# Patient Record
Sex: MALE | Race: WHITE | NOT HISPANIC OR LATINO
[De-identification: names, ages, dates, MRNs, and addresses within clinical notes are randomized per-mention and may not be internally consistent; named-entity substitution may affect disease eponyms.]

---

## 2018-03-19 ENCOUNTER — TRANSCRIPTION ENCOUNTER (OUTPATIENT)
Age: 78
End: 2018-03-19

## 2019-11-21 ENCOUNTER — EMERGENCY (EMERGENCY)
Facility: HOSPITAL | Age: 79
LOS: 0 days | Discharge: HOME | End: 2019-11-21
Attending: EMERGENCY MEDICINE | Admitting: EMERGENCY MEDICINE
Payer: MEDICARE

## 2019-11-21 VITALS
RESPIRATION RATE: 18 BRPM | SYSTOLIC BLOOD PRESSURE: 180 MMHG | TEMPERATURE: 98 F | HEIGHT: 69 IN | HEART RATE: 90 BPM | DIASTOLIC BLOOD PRESSURE: 98 MMHG | OXYGEN SATURATION: 98 % | WEIGHT: 167.99 LBS

## 2019-11-21 VITALS — SYSTOLIC BLOOD PRESSURE: 145 MMHG | HEART RATE: 86 BPM | DIASTOLIC BLOOD PRESSURE: 97 MMHG

## 2019-11-21 DIAGNOSIS — H92.02 OTALGIA, LEFT EAR: ICD-10-CM

## 2019-11-21 DIAGNOSIS — H92.09 OTALGIA, UNSPECIFIED EAR: ICD-10-CM

## 2019-11-21 PROBLEM — Z00.00 ENCOUNTER FOR PREVENTIVE HEALTH EXAMINATION: Status: ACTIVE | Noted: 2019-11-21

## 2019-11-21 PROCEDURE — 99282 EMERGENCY DEPT VISIT SF MDM: CPT | Mod: GC

## 2019-11-21 NOTE — ED PROVIDER NOTE - OBJECTIVE STATEMENT
78M with pmh of HTN presents with episodes of sensation of L ear pulsations and tingling lasting aprox 10-15s beginning last night. PT states that he has had these episodes for years. PT denies loss of balance, other numbness, weakness, or slurring of speech.

## 2019-11-21 NOTE — ED PROVIDER NOTE - PATIENT PORTAL LINK FT
You can access the FollowMyHealth Patient Portal offered by [facility] by registering at the following website: [email or web address]. By joining [facility]’s FollowMyHealth portal, you will also be able to view your health information using other applications (apps) compatible with our system.

## 2019-11-21 NOTE — ED ADULT NURSE NOTE - NSIMPLEMENTINTERV_GEN_ALL_ED
Implemented All Universal Safety Interventions:  [place] to call system. Call bell, personal items and telephone within reach. Instruct patient to call for assistance. Room bathroom lighting operational. Non-slip footwear when patient is off stretcher. Physically safe environment: no spills, clutter or unnecessary equipment. Stretcher in lowest position, wheels locked, appropriate side rails in place.

## 2019-11-21 NOTE — ED PROVIDER NOTE - CLINICAL SUMMARY MEDICAL DECISION MAKING FREE TEXT BOX
78yoM with h/o HTN, presents with ear pulsations. States x 15 yrs has been getting intermittent pulsations in his L ear with L facial tingling, lasting 10-15 seconds at a time and self-resolving, recurred last night. Has had 2 MRI's during this time that were negative. Denies HA, blurry vision, dizziness, vomiting, weakness or numbness, ear pain, or any other symptoms. On exam, afebrile, hemodynamically stable, saturating well, NAD, well appearing, head NCAT, PERRL, EOMI, anicteric, MMM, TM's clear with sharp reflexes b/l, no external canal erythema/edema/tenderness, RRR, nml S1/S2, no m/r/g, lungs CTAB, no w/r/r, abd soft, NT, ND, nml BS, no rebound or guarding, AAO, CN's 3-12 intact, motor 5/5 in all extrems, LYN spontaneously, no leg cyanosis or edema, skin warm, dry, no rashes or hives. No e/o OM, OE, mastoiditis. No e/o dissection. Character low suspicion for SAH or other intracranial process. Has had extensive w/u for this in the past. Patient is well appearing, NAD, afebrile, hemodynamically stable. Discharged with instructions in further symptomatic care, return precautions, and need for ENT f/u. 78yoM with h/o HTN, presents with ear pulsations. States x 15 yrs has been getting intermittent pulsations in his L ear with L facial tingling, lasting 10-15 seconds at a time and self-resolving, recurred last night. Has had 2 MRI's during this time that were negative. Denies HA, blurry vision, dizziness, vomiting, weakness or numbness, ear pain, or any other symptoms. On exam, afebrile, hemodynamically stable, saturating well, NAD, well appearing, head NCAT, PERRL, EOMI, anicteric, MMM, TM's clear with sharp reflexes b/l, no external canal erythema/edema/tenderness, RRR, nml S1/S2, no m/r/g, lungs CTAB, no w/r/r, abd soft, NT, ND, nml BS, no rebound or guarding, AAO, CN's 3-12 intact, motor 5/5 in all extrems, LYN spontaneously, no leg cyanosis or edema, skin warm, dry, no rashes or hives. No e/o OM, OE, mastoiditis. No actual numbness or neuro deficits to suggest CVA. No e/o dissection. Character low suspicion for SAH or other intracranial process. Has had extensive w/u for this in the past. Patient is well appearing, NAD, afebrile, hemodynamically stable. Discharged with instructions in further symptomatic care, return precautions, and need for ENT f/u.

## 2019-11-21 NOTE — ED PROVIDER NOTE - NSFOLLOWUPINSTRUCTIONS_ED_ALL_ED_FT
Follow up with your primary care provider and Dr. Styles in the next 24-72 hours. Return to the ED at any time or if any new or worsening symptoms.

## 2019-11-21 NOTE — ED ADULT TRIAGE NOTE - CHIEF COMPLAINT QUOTE
77 yo M presents to ED with pulsating in left ear and intermittent "tingling". patient denies weakness, numbness, facial movement appear symmetrical. NIH 0 patient states he has had this episode in the past where he was evaluated. Patient states this episode began last night at 6 PM and he was unable to sleep. PMH HTN

## 2019-11-21 NOTE — ED ADULT NURSE NOTE - CHIEF COMPLAINT QUOTE
79 yo M presents to ED with pulsating in left ear and intermittent "tingling". patient denies weakness, numbness, facial movement appear symmetrical. NIH 0 patient states he has had this episode in the past where he was evaluated. Patient states this episode began last night at 6 PM and he was unable to sleep. PMH HTN

## 2019-12-06 ENCOUNTER — APPOINTMENT (OUTPATIENT)
Dept: OTOLARYNGOLOGY | Facility: CLINIC | Age: 79
End: 2019-12-06

## 2020-10-21 ENCOUNTER — TRANSCRIPTION ENCOUNTER (OUTPATIENT)
Age: 80
End: 2020-10-21

## 2021-08-12 ENCOUNTER — TRANSCRIPTION ENCOUNTER (OUTPATIENT)
Age: 81
End: 2021-08-12

## 2021-10-06 PROBLEM — Z78.9 OTHER SPECIFIED HEALTH STATUS: Chronic | Status: ACTIVE | Noted: 2019-11-21

## 2021-10-21 ENCOUNTER — APPOINTMENT (OUTPATIENT)
Dept: OTOLARYNGOLOGY | Facility: CLINIC | Age: 81
End: 2021-10-21
Payer: MEDICARE

## 2021-10-21 VITALS — BODY MASS INDEX: 25.03 KG/M2 | WEIGHT: 169 LBS | HEIGHT: 69 IN

## 2021-10-21 DIAGNOSIS — H90.5 UNSPECIFIED SENSORINEURAL HEARING LOSS: ICD-10-CM

## 2021-10-21 DIAGNOSIS — R20.2 PARESTHESIA OF SKIN: ICD-10-CM

## 2021-10-21 DIAGNOSIS — H93.A2 PULSATILE TINNITUS, LEFT EAR: ICD-10-CM

## 2021-10-21 PROCEDURE — 92550 TYMPANOMETRY & REFLEX THRESH: CPT

## 2021-10-21 PROCEDURE — 99204 OFFICE O/P NEW MOD 45 MIN: CPT | Mod: 25

## 2021-10-21 PROCEDURE — 92557 COMPREHENSIVE HEARING TEST: CPT

## 2021-10-21 RX ORDER — DIAZEPAM 10 MG/1
10 TABLET ORAL
Qty: 2 | Refills: 0 | Status: ACTIVE | COMMUNITY
Start: 2021-10-21 | End: 1900-01-01

## 2021-10-21 NOTE — HISTORY OF PRESENT ILLNESS
[de-identified] : Patient presents today with c/o left ear tinnitus. Patient has pulsating and tingling over left eye down to ear, that leads to tinnitus. Patient started noticing this since August 2021. Intermittent, He admits only lasts about 10 seconds. He was seen by primary given amoxicillin without improvement. No hearing loss. He admits left ear does feel sore inside. No otalgia. hx of occupational noise exposure, was a .  [Hearing Loss] : hearing loss [Ear Fullness] : no ear fullness [Vertigo] : no vertigo [None] : No associated symptoms are reported.

## 2021-10-25 ENCOUNTER — LABORATORY RESULT (OUTPATIENT)
Age: 81
End: 2021-10-25

## 2022-09-27 ENCOUNTER — NON-APPOINTMENT (OUTPATIENT)
Age: 82
End: 2022-09-27

## 2023-08-23 ENCOUNTER — NON-APPOINTMENT (OUTPATIENT)
Age: 83
End: 2023-08-23

## 2024-03-06 ENCOUNTER — NON-APPOINTMENT (OUTPATIENT)
Age: 84
End: 2024-03-06